# Patient Record
Sex: FEMALE | Race: WHITE | NOT HISPANIC OR LATINO | URBAN - METROPOLITAN AREA
[De-identification: names, ages, dates, MRNs, and addresses within clinical notes are randomized per-mention and may not be internally consistent; named-entity substitution may affect disease eponyms.]

---

## 2018-12-05 PROBLEM — 414916001 OBESITY: Status: ACTIVE | Noted: 2018-12-05

## 2018-12-05 PROBLEM — 429006005 FAMILY HISTORY OF MALIGNANT NEOPLASM OF GASTROINTESTINAL TRACT: Status: ACTIVE | Noted: 2018-12-05

## 2018-12-05 PROBLEM — 40930008 HYPOTHYROIDISM: Status: ACTIVE | Noted: 2018-12-05

## 2018-12-05 PROBLEM — 428283002 HISTORY OF POLYP OF COLON (SITUATION): Status: ACTIVE | Noted: 2018-12-05

## 2022-04-30 ENCOUNTER — TELEPHONE ENCOUNTER (OUTPATIENT)
Dept: URBAN - METROPOLITAN AREA CLINIC 121 | Facility: CLINIC | Age: 72
End: 2022-04-30

## 2022-04-30 RX ORDER — LEVOTHYROXINE SODIUM 100 MCG
TABLET ORAL
OUTPATIENT
Start: 2006-12-05

## 2022-04-30 RX ORDER — LEVOTHYROXINE SODIUM 100 MCG
QD TABLET ORAL
OUTPATIENT
Start: 2012-09-11

## 2022-04-30 RX ORDER — ASPIRIN 81 MG/1
TABLET, DELAYED RELEASE ORAL
OUTPATIENT
Start: 2006-11-29

## 2022-04-30 RX ORDER — EZETIMIBE 10 MG/1
TABLET ORAL
OUTPATIENT
Start: 2006-12-05

## 2022-04-30 RX ORDER — SIMVASTATIN 20 MG/1
QD TABLET, FILM COATED ORAL
OUTPATIENT
Start: 2012-09-11 | End: 2015-10-02

## 2022-04-30 RX ORDER — SIMVASTATIN 20 MG/1
QD TABLET, FILM COATED ORAL
OUTPATIENT
Start: 2012-09-11

## 2022-04-30 RX ORDER — SIMVASTATIN 20 MG/1
TABLET, FILM COATED ORAL
OUTPATIENT
Start: 2006-12-05

## 2022-04-30 RX ORDER — EZETIMIBE 10 MG/1
TABLET ORAL
OUTPATIENT
Start: 2006-12-05 | End: 2012-09-11

## 2022-05-01 ENCOUNTER — TELEPHONE ENCOUNTER (OUTPATIENT)
Dept: URBAN - METROPOLITAN AREA CLINIC 121 | Facility: CLINIC | Age: 72
End: 2022-05-01

## 2022-05-01 RX ORDER — LEVOTHYROXINE SODIUM 112 UG/1
TABLET ORAL
Status: ACTIVE | COMMUNITY
Start: 2015-10-02

## 2022-05-01 RX ORDER — PRAVASTATIN SODIUM 40 MG/1
TABLET ORAL
Status: ACTIVE | COMMUNITY
Start: 2015-10-02

## 2022-05-01 RX ORDER — METFORMIN HCL 500 MG/1
TABLET ORAL
Status: ACTIVE | COMMUNITY
Start: 2015-10-02

## 2022-05-01 RX ORDER — OMEGA-3S/DHA/EPA/FISH OIL 980-1400MG
CAPSULE,DELAYED RELEASE (ENTERIC COATED) ORAL
Status: ACTIVE | COMMUNITY
Start: 2018-12-05

## 2022-05-01 RX ORDER — CALCIUM CARBONATE 500(1250)
TABLET ORAL
Status: ACTIVE | COMMUNITY
Start: 2015-10-02

## 2022-05-01 RX ORDER — ASPIRIN 81 MG/1
QD TABLET, DELAYED RELEASE ORAL
Status: ACTIVE | COMMUNITY
Start: 2012-09-11

## 2022-05-01 RX ORDER — UBIDECARENONE/VIT E ACET 100MG-5
CAPSULE ORAL
Status: ACTIVE | COMMUNITY
Start: 2018-12-05

## 2022-05-01 RX ORDER — GLUCOSAMINE SULFATE 500 MG
CAPSULE ORAL
Status: ACTIVE | COMMUNITY
Start: 2015-10-02

## 2023-10-13 ENCOUNTER — OFFICE VISIT (OUTPATIENT)
Dept: URBAN - METROPOLITAN AREA CLINIC 78 | Facility: CLINIC | Age: 73
End: 2023-10-13

## 2023-10-23 ENCOUNTER — OFFICE VISIT (OUTPATIENT)
Dept: URBAN - METROPOLITAN AREA CLINIC 78 | Facility: CLINIC | Age: 73
End: 2023-10-23
Payer: MEDICARE

## 2023-10-23 ENCOUNTER — DASHBOARD ENCOUNTERS (OUTPATIENT)
Age: 73
End: 2023-10-23

## 2023-10-23 VITALS
WEIGHT: 187 LBS | HEART RATE: 81 BPM | BODY MASS INDEX: 31.92 KG/M2 | TEMPERATURE: 98.1 F | SYSTOLIC BLOOD PRESSURE: 145 MMHG | HEIGHT: 64 IN | DIASTOLIC BLOOD PRESSURE: 77 MMHG | RESPIRATION RATE: 16 BRPM

## 2023-10-23 DIAGNOSIS — Z86.010 PERSONAL HISTORY OF COLONIC POLYPS: ICD-10-CM

## 2023-10-23 PROCEDURE — 993 AGA: Performed by: INTERNAL MEDICINE

## 2023-10-23 PROCEDURE — 99203 OFFICE O/P NEW LOW 30 MIN: CPT | Performed by: INTERNAL MEDICINE

## 2023-10-23 RX ORDER — UBIDECARENONE/VIT E ACET 100MG-5
CAPSULE ORAL
Status: ACTIVE | COMMUNITY
Start: 2018-12-05

## 2023-10-23 RX ORDER — OMEGA-3S/DHA/EPA/FISH OIL 980-1400MG
CAPSULE,DELAYED RELEASE (ENTERIC COATED) ORAL
Status: ACTIVE | COMMUNITY
Start: 2018-12-05

## 2023-10-23 RX ORDER — LEVOTHYROXINE SODIUM 112 UG/1
TABLET ORAL
Status: ACTIVE | COMMUNITY
Start: 2015-10-02

## 2023-10-23 RX ORDER — ASPIRIN 81 MG/1
QD TABLET, DELAYED RELEASE ORAL
Status: ACTIVE | COMMUNITY
Start: 2012-09-11

## 2023-10-23 RX ORDER — GLUCOSAMINE SULFATE 500 MG
CAPSULE ORAL
Status: ACTIVE | COMMUNITY
Start: 2015-10-02

## 2023-10-23 RX ORDER — CALCIUM CARBONATE 500(1250)
TABLET ORAL
Status: ACTIVE | COMMUNITY
Start: 2015-10-02

## 2023-10-23 RX ORDER — PRAVASTATIN SODIUM 40 MG/1
TABLET ORAL
Status: ACTIVE | COMMUNITY
Start: 2015-10-02

## 2023-10-23 RX ORDER — METFORMIN HCL 500 MG/1
TABLET ORAL
Status: ON HOLD | COMMUNITY
Start: 2015-10-02

## 2023-10-23 RX ORDER — SODIUM PICOSULFATE, MAGNESIUM OXIDE, AND ANHYDROUS CITRIC ACID 12; 3.5; 1 G/175ML; G/175ML; MG/175ML
175 ML THE FIRST DOSE THE EVENING BEFORE AND SECOND DOSE THE MORNING OF COLONOSCOPY LIQUID ORAL ONCE A DAY
Qty: 350 | OUTPATIENT
Start: 2023-10-23 | End: 2023-10-25

## 2023-10-23 NOTE — PHYSICAL EXAM GASTROINTESTINAL
Abdomen , soft, non-tender, non-distended , no guarding or rigidity , no masses palpable , normal bowel sounds, central obesity Liver and Spleen , no hepatomegaly present , liver non-tender , spleen not palpable

## 2023-10-23 NOTE — HPI-TODAY'S VISIT:
Patient was referred by Dr. Jason Chew  A copy of this document will be sent to the physician.   The patient presents for a colon cancer surveillance .Patient denies change in bowel habits, appetite, and weight. Patient denies bleeding per rectum. Last colonoscopy:2019 no polyps  2015 : TA polyps and prior colonoscopy  Patient has been notified about her family history through her cousin who is a nurse.  There history of cancers on maternal side of the family, all of the colon cancers   She has a knee brace and does not require total knee replacement.  In May after a fall she had CT scan done and required a subsequent MRI of the head there were some concerning lesions.  For which she has seen neurosurgeon who has deemed conservative management and asked her not to worry about it.  Denies heartburn, denies reflux denies dysphagia Takes Aleve once a day  Deneis chest pain  Deneis SOB  Denies easy brusing  Denies anesthesia complication   Cardiac risk :No pending cardiac work up

## 2023-11-28 ENCOUNTER — CLAIMS CREATED FROM THE CLAIM WINDOW (OUTPATIENT)
Dept: URBAN - METROPOLITAN AREA SURGERY CENTER 15 | Facility: SURGERY CENTER | Age: 73
End: 2023-11-28
Payer: MEDICARE

## 2023-11-28 DIAGNOSIS — Z12.11 COLON CANCER SCREENING: ICD-10-CM

## 2023-11-28 DIAGNOSIS — Z12.11 COLON CANCER SCREENING (HIGH RISK): ICD-10-CM

## 2023-11-28 DIAGNOSIS — K64.9 HEMORRHOIDS: ICD-10-CM

## 2023-11-28 PROCEDURE — G0121 COLON CA SCRN NOT HI RSK IND: HCPCS | Performed by: INTERNAL MEDICINE

## 2023-11-28 PROCEDURE — G0121 COLON CA SCRN NOT HI RSK IND: HCPCS | Performed by: CLINIC/CENTER

## 2023-11-28 PROCEDURE — 00811 ANES LWR INTST NDSC NOS: CPT | Performed by: NURSE ANESTHETIST, CERTIFIED REGISTERED

## 2023-11-28 PROCEDURE — G8907 PT DOC NO EVENTS ON DISCHARG: HCPCS | Performed by: CLINIC/CENTER
